# Patient Record
Sex: FEMALE | Race: BLACK OR AFRICAN AMERICAN | NOT HISPANIC OR LATINO | URBAN - METROPOLITAN AREA
[De-identification: names, ages, dates, MRNs, and addresses within clinical notes are randomized per-mention and may not be internally consistent; named-entity substitution may affect disease eponyms.]

---

## 2017-02-27 ENCOUNTER — EMERGENCY (EMERGENCY)
Facility: HOSPITAL | Age: 57
LOS: 1 days | Discharge: HOME | End: 2017-02-27
Attending: EMERGENCY MEDICINE | Admitting: EMERGENCY MEDICINE
Payer: COMMERCIAL

## 2017-02-27 VITALS
HEART RATE: 67 BPM | RESPIRATION RATE: 17 BRPM | DIASTOLIC BLOOD PRESSURE: 91 MMHG | OXYGEN SATURATION: 100 % | SYSTOLIC BLOOD PRESSURE: 150 MMHG | TEMPERATURE: 98 F

## 2017-02-27 VITALS
SYSTOLIC BLOOD PRESSURE: 150 MMHG | HEART RATE: 67 BPM | OXYGEN SATURATION: 100 % | DIASTOLIC BLOOD PRESSURE: 86 MMHG | RESPIRATION RATE: 16 BRPM | TEMPERATURE: 98 F

## 2017-02-27 PROCEDURE — 99284 EMERGENCY DEPT VISIT MOD MDM: CPT | Mod: 25

## 2017-02-27 RX ORDER — METOCLOPRAMIDE HCL 10 MG
10 TABLET ORAL ONCE
Qty: 0 | Refills: 0 | Status: COMPLETED | OUTPATIENT
Start: 2017-02-27 | End: 2017-02-27

## 2017-02-27 RX ORDER — MECLIZINE HCL 12.5 MG
2 TABLET ORAL
Qty: 18 | Refills: 0 | OUTPATIENT
Start: 2017-02-27

## 2017-02-27 RX ORDER — MECLIZINE HCL 12.5 MG
50 TABLET ORAL ONCE
Qty: 0 | Refills: 0 | Status: COMPLETED | OUTPATIENT
Start: 2017-02-27 | End: 2017-02-27

## 2017-02-27 RX ORDER — METOCLOPRAMIDE HCL 10 MG
1 TABLET ORAL
Qty: 15 | Refills: 0 | OUTPATIENT
Start: 2017-02-27

## 2017-02-27 RX ADMIN — Medication 10 MILLIGRAM(S): at 03:43

## 2017-02-27 RX ADMIN — Medication 50 MILLIGRAM(S): at 02:22

## 2017-02-27 NOTE — ED PROVIDER NOTE - MEDICAL DECISION MAKING DETAILS
pt with signs of peripheral vertigo.  No red flags for central.  Will check finger stick to ensure not related to hyperglycemia.  EKG with no signs of arrythmia.  Will treat symptomatically and if not improved will get labs/imaging.

## 2017-02-27 NOTE — ED ADULT NURSE NOTE - OBJECTIVE STATEMENT
Pt BIBA received into trauma room c co dizziness since 12am tonight. Pt states she layed down to go to sleep and felt like the room is spinning and would not stop. Pt states spinning is constant and does not change with position changes. Pt A&Ox4, in NAD. Pt denies any pain at this time, denies HA, palpitations, fevers, blurry vision, N, V. MD evaluated, VS as noted, Medicated as ordered. Comfort measures provided, family at bedside, will continue to monitor.

## 2017-02-27 NOTE — ED ADULT TRIAGE NOTE - CHIEF COMPLAINT QUOTE
Pt BIBEMS [facility] from Home c/o Dizziness since 12MN, with nausea denies Vomiting CP HA palpitation SOB. Pt reports Left sided Chest pain non radiation on and off 1 week ago No CP right now

## 2017-02-27 NOTE — ED PROVIDER NOTE - OBJECTIVE STATEMENT
57 yo with sudden onset of vertiginous symptoms while at rest.  Worse with moving head.  Symptoms are severe and not associated with HA.  No other weakness numbness or fevers.  No gait abnormalities.  Here with sister who says that up until tonight she has been acting completely normally.  No other complaints at the current time and no history of same or DM